# Patient Record
Sex: MALE | Race: WHITE | NOT HISPANIC OR LATINO | URBAN - METROPOLITAN AREA
[De-identification: names, ages, dates, MRNs, and addresses within clinical notes are randomized per-mention and may not be internally consistent; named-entity substitution may affect disease eponyms.]

---

## 2018-03-20 ENCOUNTER — APPOINTMENT (RX ONLY)
Dept: URBAN - METROPOLITAN AREA CLINIC 349 | Facility: CLINIC | Age: 63
Setting detail: DERMATOLOGY
End: 2018-03-20

## 2018-03-20 DIAGNOSIS — L259 CONTACT DERMATITIS AND OTHER ECZEMA, UNSPECIFIED CAUSE: ICD-10-CM

## 2018-03-20 DIAGNOSIS — L21.8 OTHER SEBORRHEIC DERMATITIS: ICD-10-CM

## 2018-03-20 PROBLEM — L30.8 OTHER SPECIFIED DERMATITIS: Status: ACTIVE | Noted: 2018-03-20

## 2018-03-20 PROCEDURE — ? COUNSELING

## 2018-03-20 PROCEDURE — ? PRESCRIPTION

## 2018-03-20 PROCEDURE — ? TREATMENT REGIMEN

## 2018-03-20 PROCEDURE — 99242 OFF/OP CONSLTJ NEW/EST SF 20: CPT

## 2018-03-20 PROCEDURE — ? OTHER

## 2018-03-20 RX ORDER — KETOCONAZOLE 20.5 MG/ML
SHAMPOO, SUSPENSION TOPICAL
Qty: 1 | Refills: 6 | Status: ERX | COMMUNITY
Start: 2018-03-20

## 2018-03-20 RX ORDER — DESONIDE 0.5 MG/G
CREAM TOPICAL BID
Qty: 1 | Refills: 2 | Status: ERX | COMMUNITY
Start: 2018-03-20

## 2018-03-20 RX ADMIN — DESONIDE: 0.5 CREAM TOPICAL at 14:25

## 2018-03-20 RX ADMIN — KETOCONAZOLE: 20.5 SHAMPOO, SUSPENSION TOPICAL at 14:29

## 2018-03-20 ASSESSMENT — LOCATION ZONE DERM: LOCATION ZONE: FACE

## 2018-03-20 ASSESSMENT — LOCATION SIMPLE DESCRIPTION DERM
LOCATION SIMPLE: RIGHT CHEEK
LOCATION SIMPLE: LEFT CHEEK
LOCATION SIMPLE: RIGHT EYEBROW
LOCATION SIMPLE: LEFT EYEBROW

## 2018-03-20 ASSESSMENT — LOCATION DETAILED DESCRIPTION DERM
LOCATION DETAILED: RIGHT CENTRAL EYEBROW
LOCATION DETAILED: RIGHT INFERIOR CENTRAL MALAR CHEEK
LOCATION DETAILED: LEFT SUPERIOR MEDIAL BUCCAL CHEEK
LOCATION DETAILED: LEFT CENTRAL EYEBROW

## 2018-03-20 NOTE — PROCEDURE: OTHER
Other (Free Text): Dr. Perry initiated and approved treatment plan\\n\\nWill not send rx for Desonide until we see if samples work
Note Text (......Xxx Chief Complaint.): Photographs taken with pt permission
Detail Level: Zone

## 2018-03-20 NOTE — PROCEDURE: TREATMENT REGIMEN
Initiate Treatment: Ketoconazole shampoo to use as face wash, leave on 3 minutes then rinse
Initiate Treatment: Desonide to use on eyelid area.  A very small amount 2 times daily for 2 weeks then daily.
Samples Given: Desonide
Detail Level: Zone

## 2018-05-09 ENCOUNTER — APPOINTMENT (RX ONLY)
Dept: URBAN - METROPOLITAN AREA CLINIC 349 | Facility: CLINIC | Age: 63
Setting detail: DERMATOLOGY
End: 2018-05-09

## 2018-05-09 ENCOUNTER — RX ONLY (OUTPATIENT)
Age: 63
Setting detail: RX ONLY
End: 2018-05-09

## 2018-05-09 DIAGNOSIS — L21.8 OTHER SEBORRHEIC DERMATITIS: ICD-10-CM | Status: IMPROVED

## 2018-05-09 DIAGNOSIS — L259 CONTACT DERMATITIS AND OTHER ECZEMA, UNSPECIFIED CAUSE: ICD-10-CM | Status: IMPROVED

## 2018-05-09 PROBLEM — L30.8 OTHER SPECIFIED DERMATITIS: Status: ACTIVE | Noted: 2018-05-09

## 2018-05-09 PROCEDURE — ? COUNSELING

## 2018-05-09 PROCEDURE — 99213 OFFICE O/P EST LOW 20 MIN: CPT

## 2018-05-09 PROCEDURE — ? TREATMENT REGIMEN

## 2018-05-09 PROCEDURE — ? OTHER

## 2018-05-09 RX ORDER — HYDROCORTISONE BUTYRATE 1 MG/G
CREAM TOPICAL
Qty: 1 | Refills: 2 | Status: ERX | COMMUNITY
Start: 2018-05-09

## 2018-05-09 ASSESSMENT — LOCATION DETAILED DESCRIPTION DERM
LOCATION DETAILED: LEFT SUPERIOR MEDIAL BUCCAL CHEEK
LOCATION DETAILED: LEFT CENTRAL EYEBROW
LOCATION DETAILED: RIGHT CENTRAL EYEBROW
LOCATION DETAILED: RIGHT INFERIOR CENTRAL MALAR CHEEK

## 2018-05-09 ASSESSMENT — LOCATION SIMPLE DESCRIPTION DERM
LOCATION SIMPLE: LEFT CHEEK
LOCATION SIMPLE: RIGHT CHEEK
LOCATION SIMPLE: RIGHT EYEBROW
LOCATION SIMPLE: LEFT EYEBROW

## 2018-05-09 ASSESSMENT — LOCATION ZONE DERM: LOCATION ZONE: FACE

## 2018-05-09 NOTE — PROCEDURE: TREATMENT REGIMEN
Samples Given: Desonide
Plan: Continue Desonide cream once daily or less often as needed.
Detail Level: Zone
Initiate Treatment: Desonide to use on eyelid area.  A very small amount 2 times daily for 2 weeks then daily.

## 2018-05-09 NOTE — PROCEDURE: OTHER
Other (Free Text): Dr. Perry initiated and approved treatment plan
Note Text (......Xxx Chief Complaint.): Photographs taken with pt permission
Detail Level: Zone

## 2022-05-31 ENCOUNTER — CLINICAL DOCUMENTATION (OUTPATIENT)
Dept: ORTHOPEDIC SURGERY | Age: 67
End: 2022-05-31

## 2022-10-04 ENCOUNTER — HOSPITAL ENCOUNTER (OUTPATIENT)
Facility: CLINIC | Age: 67
Setting detail: OBSERVATION
Discharge: HOME | End: 2022-10-05
Attending: EMERGENCY MEDICINE | Admitting: INTERNAL MEDICINE
Payer: MEDICARE

## 2022-10-04 ENCOUNTER — APPOINTMENT (OUTPATIENT)
Dept: CT IMAGING | Facility: CLINIC | Age: 67
End: 2022-10-04
Attending: EMERGENCY MEDICINE
Payer: MEDICARE

## 2022-10-04 DIAGNOSIS — R19.7 DIARRHEA, UNSPECIFIED TYPE: ICD-10-CM

## 2022-10-04 DIAGNOSIS — A04.72 C. DIFFICILE DIARRHEA: Primary | ICD-10-CM

## 2022-10-04 DIAGNOSIS — R10.11 RUQ ABDOMINAL PAIN: ICD-10-CM

## 2022-10-04 LAB
ALBUMIN SERPL-MCNC: 3.2 G/DL (ref 3.4–5)
ALBUMIN UR-MCNC: 20 MG/DL
ALP SERPL-CCNC: 118 U/L (ref 40–150)
ALT SERPL W P-5'-P-CCNC: 30 U/L (ref 0–70)
ANION GAP SERPL CALCULATED.3IONS-SCNC: 9 MMOL/L (ref 3–14)
APPEARANCE UR: CLEAR
AST SERPL W P-5'-P-CCNC: 16 U/L (ref 0–45)
ATRIAL RATE - MUSE: 91 BPM
BASOPHILS # BLD AUTO: 0 10E3/UL (ref 0–0.2)
BASOPHILS NFR BLD AUTO: 0 %
BILIRUB SERPL-MCNC: 0.9 MG/DL (ref 0.2–1.3)
BILIRUB UR QL STRIP: NEGATIVE
BUN SERPL-MCNC: 17 MG/DL (ref 7–30)
C DIFF TOX B STL QL: POSITIVE
CALCIUM SERPL-MCNC: 9.9 MG/DL (ref 8.5–10.1)
CHLORIDE BLD-SCNC: 105 MMOL/L (ref 94–109)
CO2 SERPL-SCNC: 23 MMOL/L (ref 20–32)
COLOR UR AUTO: YELLOW
CREAT SERPL-MCNC: 1.23 MG/DL (ref 0.66–1.25)
DIASTOLIC BLOOD PRESSURE - MUSE: NORMAL MMHG
EOSINOPHIL # BLD AUTO: 0 10E3/UL (ref 0–0.7)
EOSINOPHIL NFR BLD AUTO: 0 %
ERYTHROCYTE [DISTWIDTH] IN BLOOD BY AUTOMATED COUNT: 16.9 % (ref 10–15)
GFR SERPL CREATININE-BSD FRML MDRD: 65 ML/MIN/1.73M2
GLUCOSE BLD-MCNC: 94 MG/DL (ref 70–99)
GLUCOSE UR STRIP-MCNC: NEGATIVE MG/DL
HCT VFR BLD AUTO: 35.5 % (ref 40–53)
HGB BLD-MCNC: 11.4 G/DL (ref 13.3–17.7)
HGB UR QL STRIP: NEGATIVE
HOLD SPECIMEN: NORMAL
HYALINE CASTS: 3 /LPF
IMM GRANULOCYTES # BLD: 0.1 10E3/UL
IMM GRANULOCYTES NFR BLD: 1 %
INTERPRETATION ECG - MUSE: NORMAL
KETONES UR STRIP-MCNC: 10 MG/DL
LEUKOCYTE ESTERASE UR QL STRIP: NEGATIVE
LIPASE SERPL-CCNC: 181 U/L (ref 73–393)
LYMPHOCYTES # BLD AUTO: 2.1 10E3/UL (ref 0.8–5.3)
LYMPHOCYTES NFR BLD AUTO: 15 %
MCH RBC QN AUTO: 30.4 PG (ref 26.5–33)
MCHC RBC AUTO-ENTMCNC: 32.1 G/DL (ref 31.5–36.5)
MCV RBC AUTO: 95 FL (ref 78–100)
MONOCYTES # BLD AUTO: 1.1 10E3/UL (ref 0–1.3)
MONOCYTES NFR BLD AUTO: 8 %
MUCOUS THREADS #/AREA URNS LPF: PRESENT /LPF
NEUTROPHILS # BLD AUTO: 10.3 10E3/UL (ref 1.6–8.3)
NEUTROPHILS NFR BLD AUTO: 76 %
NITRATE UR QL: NEGATIVE
NRBC # BLD AUTO: 0 10E3/UL
NRBC BLD AUTO-RTO: 0 /100
P AXIS - MUSE: 38 DEGREES
PH UR STRIP: 5.5 [PH] (ref 5–7)
PLATELET # BLD AUTO: 310 10E3/UL (ref 150–450)
POTASSIUM BLD-SCNC: 4.7 MMOL/L (ref 3.4–5.3)
PR INTERVAL - MUSE: 174 MS
PROT SERPL-MCNC: 8 G/DL (ref 6.8–8.8)
QRS DURATION - MUSE: 112 MS
QT - MUSE: 390 MS
QTC - MUSE: 479 MS
R AXIS - MUSE: 31 DEGREES
RBC # BLD AUTO: 3.75 10E6/UL (ref 4.4–5.9)
RBC URINE: 0 /HPF
SARS-COV-2 RNA RESP QL NAA+PROBE: NEGATIVE
SODIUM SERPL-SCNC: 137 MMOL/L (ref 133–144)
SP GR UR STRIP: 1.02 (ref 1–1.03)
SYSTOLIC BLOOD PRESSURE - MUSE: NORMAL MMHG
T AXIS - MUSE: 162 DEGREES
TROPONIN I SERPL HS-MCNC: 34 NG/L
UROBILINOGEN UR STRIP-MCNC: NORMAL MG/DL
VENTRICULAR RATE- MUSE: 91 BPM
WBC # BLD AUTO: 13.7 10E3/UL (ref 4–11)
WBC URINE: 1 /HPF

## 2022-10-04 PROCEDURE — 84484 ASSAY OF TROPONIN QUANT: CPT | Performed by: EMERGENCY MEDICINE

## 2022-10-04 PROCEDURE — 250N000013 HC RX MED GY IP 250 OP 250 PS 637: Performed by: INTERNAL MEDICINE

## 2022-10-04 PROCEDURE — G1010 CDSM STANSON: HCPCS

## 2022-10-04 PROCEDURE — 93005 ELECTROCARDIOGRAM TRACING: CPT

## 2022-10-04 PROCEDURE — 258N000003 HC RX IP 258 OP 636: Performed by: EMERGENCY MEDICINE

## 2022-10-04 PROCEDURE — C9803 HOPD COVID-19 SPEC COLLECT: HCPCS

## 2022-10-04 PROCEDURE — 87493 C DIFF AMPLIFIED PROBE: CPT | Performed by: EMERGENCY MEDICINE

## 2022-10-04 PROCEDURE — 99220 PR INITIAL OBSERVATION CARE,LEVEL III: CPT | Performed by: INTERNAL MEDICINE

## 2022-10-04 PROCEDURE — 80053 COMPREHEN METABOLIC PANEL: CPT | Performed by: EMERGENCY MEDICINE

## 2022-10-04 PROCEDURE — 250N000011 HC RX IP 250 OP 636: Performed by: INTERNAL MEDICINE

## 2022-10-04 PROCEDURE — U0003 INFECTIOUS AGENT DETECTION BY NUCLEIC ACID (DNA OR RNA); SEVERE ACUTE RESPIRATORY SYNDROME CORONAVIRUS 2 (SARS-COV-2) (CORONAVIRUS DISEASE [COVID-19]), AMPLIFIED PROBE TECHNIQUE, MAKING USE OF HIGH THROUGHPUT TECHNOLOGIES AS DESCRIBED BY CMS-2020-01-R: HCPCS | Performed by: EMERGENCY MEDICINE

## 2022-10-04 PROCEDURE — 36415 COLL VENOUS BLD VENIPUNCTURE: CPT | Performed by: EMERGENCY MEDICINE

## 2022-10-04 PROCEDURE — 96374 THER/PROPH/DIAG INJ IV PUSH: CPT

## 2022-10-04 PROCEDURE — 81001 URINALYSIS AUTO W/SCOPE: CPT | Performed by: EMERGENCY MEDICINE

## 2022-10-04 PROCEDURE — G0378 HOSPITAL OBSERVATION PER HR: HCPCS

## 2022-10-04 PROCEDURE — 82040 ASSAY OF SERUM ALBUMIN: CPT | Performed by: EMERGENCY MEDICINE

## 2022-10-04 PROCEDURE — 99291 CRITICAL CARE FIRST HOUR: CPT | Mod: 25

## 2022-10-04 PROCEDURE — 250N000011 HC RX IP 250 OP 636: Performed by: EMERGENCY MEDICINE

## 2022-10-04 PROCEDURE — 85025 COMPLETE CBC W/AUTO DIFF WBC: CPT | Performed by: EMERGENCY MEDICINE

## 2022-10-04 PROCEDURE — 83690 ASSAY OF LIPASE: CPT | Performed by: EMERGENCY MEDICINE

## 2022-10-04 PROCEDURE — 96361 HYDRATE IV INFUSION ADD-ON: CPT

## 2022-10-04 RX ORDER — AMIODARONE HYDROCHLORIDE 100 MG/1
100 TABLET ORAL DAILY
COMMUNITY

## 2022-10-04 RX ORDER — ATORVASTATIN CALCIUM 40 MG/1
40 TABLET, FILM COATED ORAL DAILY
COMMUNITY

## 2022-10-04 RX ORDER — TRAMADOL HYDROCHLORIDE 50 MG/1
50 TABLET ORAL EVERY 6 HOURS PRN
Status: DISCONTINUED | OUTPATIENT
Start: 2022-10-04 | End: 2022-10-05 | Stop reason: HOSPADM

## 2022-10-04 RX ORDER — TRAMADOL HYDROCHLORIDE 50 MG/1
50 TABLET ORAL EVERY 6 HOURS PRN
COMMUNITY

## 2022-10-04 RX ORDER — GABAPENTIN 100 MG/1
100 CAPSULE ORAL 2 TIMES DAILY
Status: DISCONTINUED | OUTPATIENT
Start: 2022-10-04 | End: 2022-10-05 | Stop reason: HOSPADM

## 2022-10-04 RX ORDER — ACETAMINOPHEN 500 MG
500 TABLET ORAL 2 TIMES DAILY PRN
COMMUNITY

## 2022-10-04 RX ORDER — PROCHLORPERAZINE MALEATE 5 MG
5 TABLET ORAL EVERY 6 HOURS PRN
Status: DISCONTINUED | OUTPATIENT
Start: 2022-10-04 | End: 2022-10-05 | Stop reason: HOSPADM

## 2022-10-04 RX ORDER — POLYETHYLENE GLYCOL 3350 17 G/17G
1 POWDER, FOR SOLUTION ORAL DAILY PRN
COMMUNITY

## 2022-10-04 RX ORDER — SODIUM CHLORIDE 9 MG/ML
INJECTION, SOLUTION INTRAVENOUS CONTINUOUS
Status: DISCONTINUED | OUTPATIENT
Start: 2022-10-04 | End: 2022-10-05

## 2022-10-04 RX ORDER — VANCOMYCIN HYDROCHLORIDE 125 MG/1
125 CAPSULE ORAL 4 TIMES DAILY
Status: DISCONTINUED | OUTPATIENT
Start: 2022-10-04 | End: 2022-10-05 | Stop reason: HOSPADM

## 2022-10-04 RX ORDER — BISACODYL 10 MG
10 SUPPOSITORY, RECTAL RECTAL DAILY PRN
Status: DISCONTINUED | OUTPATIENT
Start: 2022-10-04 | End: 2022-10-05 | Stop reason: HOSPADM

## 2022-10-04 RX ORDER — ONDANSETRON 2 MG/ML
4 INJECTION INTRAMUSCULAR; INTRAVENOUS EVERY 30 MIN PRN
Status: DISCONTINUED | OUTPATIENT
Start: 2022-10-04 | End: 2022-10-05

## 2022-10-04 RX ORDER — CEFTRIAXONE 2 G/1
2 INJECTION, POWDER, FOR SOLUTION INTRAMUSCULAR; INTRAVENOUS ONCE
Status: DISCONTINUED | OUTPATIENT
Start: 2022-10-04 | End: 2022-10-04

## 2022-10-04 RX ORDER — GABAPENTIN 100 MG/1
100 CAPSULE ORAL 2 TIMES DAILY
COMMUNITY

## 2022-10-04 RX ORDER — AMOXICILLIN 250 MG
1 CAPSULE ORAL DAILY PRN
COMMUNITY

## 2022-10-04 RX ORDER — SODIUM CHLORIDE, SODIUM LACTATE, POTASSIUM CHLORIDE, CALCIUM CHLORIDE 600; 310; 30; 20 MG/100ML; MG/100ML; MG/100ML; MG/100ML
INJECTION, SOLUTION INTRAVENOUS CONTINUOUS
Status: DISCONTINUED | OUTPATIENT
Start: 2022-10-04 | End: 2022-10-05 | Stop reason: HOSPADM

## 2022-10-04 RX ORDER — CLOPIDOGREL BISULFATE 75 MG/1
75 TABLET ORAL DAILY
Status: DISCONTINUED | OUTPATIENT
Start: 2022-10-04 | End: 2022-10-05 | Stop reason: HOSPADM

## 2022-10-04 RX ORDER — METRONIDAZOLE 500 MG/100ML
500 INJECTION, SOLUTION INTRAVENOUS ONCE
Status: DISCONTINUED | OUTPATIENT
Start: 2022-10-04 | End: 2022-10-04

## 2022-10-04 RX ORDER — ONDANSETRON 2 MG/ML
4 INJECTION INTRAMUSCULAR; INTRAVENOUS EVERY 6 HOURS PRN
Status: DISCONTINUED | OUTPATIENT
Start: 2022-10-04 | End: 2022-10-05 | Stop reason: HOSPADM

## 2022-10-04 RX ORDER — ONDANSETRON 4 MG/1
4 TABLET, ORALLY DISINTEGRATING ORAL EVERY 6 HOURS PRN
Status: DISCONTINUED | OUTPATIENT
Start: 2022-10-04 | End: 2022-10-05 | Stop reason: HOSPADM

## 2022-10-04 RX ORDER — PANTOPRAZOLE SODIUM 40 MG/1
40 TABLET, DELAYED RELEASE ORAL 2 TIMES DAILY
COMMUNITY

## 2022-10-04 RX ORDER — CLOPIDOGREL BISULFATE 75 MG/1
75 TABLET ORAL DAILY
COMMUNITY

## 2022-10-04 RX ORDER — PANTOPRAZOLE SODIUM 40 MG/1
40 TABLET, DELAYED RELEASE ORAL 2 TIMES DAILY
Status: DISCONTINUED | OUTPATIENT
Start: 2022-10-04 | End: 2022-10-05 | Stop reason: HOSPADM

## 2022-10-04 RX ORDER — PROCHLORPERAZINE 25 MG
12.5 SUPPOSITORY, RECTAL RECTAL EVERY 12 HOURS PRN
Status: DISCONTINUED | OUTPATIENT
Start: 2022-10-04 | End: 2022-10-05 | Stop reason: HOSPADM

## 2022-10-04 RX ORDER — POLYETHYLENE GLYCOL 3350 17 G/17G
17 POWDER, FOR SOLUTION ORAL DAILY PRN
Status: DISCONTINUED | OUTPATIENT
Start: 2022-10-04 | End: 2022-10-05 | Stop reason: HOSPADM

## 2022-10-04 RX ADMIN — ONDANSETRON 4 MG: 4 TABLET, ORALLY DISINTEGRATING ORAL at 20:47

## 2022-10-04 RX ADMIN — SODIUM CHLORIDE: 9 INJECTION, SOLUTION INTRAVENOUS at 17:36

## 2022-10-04 RX ADMIN — CLOPIDOGREL BISULFATE 75 MG: 75 TABLET ORAL at 20:52

## 2022-10-04 RX ADMIN — GABAPENTIN 100 MG: 100 CAPSULE ORAL at 20:46

## 2022-10-04 RX ADMIN — ONDANSETRON 4 MG: 2 INJECTION INTRAMUSCULAR; INTRAVENOUS at 10:28

## 2022-10-04 RX ADMIN — VANCOMYCIN HYDROCHLORIDE 125 MG: 125 CAPSULE ORAL at 22:18

## 2022-10-04 RX ADMIN — PANTOPRAZOLE SODIUM 40 MG: 40 TABLET, DELAYED RELEASE ORAL at 20:46

## 2022-10-04 ASSESSMENT — ENCOUNTER SYMPTOMS
ABDOMINAL PAIN: 1
COUGH: 0
NAUSEA: 1
APPETITE CHANGE: 1
VOMITING: 1
FEVER: 0

## 2022-10-04 ASSESSMENT — ACTIVITIES OF DAILY LIVING (ADL)
ADLS_ACUITY_SCORE: 35

## 2022-10-04 NOTE — ED NOTES
[facility]  ED Nurse Handoff Report    ED Chief complaint: Abdominal Pain and Nausea, Vomiting, & Diarrhea      ED Diagnosis:   Final diagnoses:   RUQ abdominal pain   Diarrhea, unspecified type       Code Status: Not addressed in ED    Allergies: No Known Allergies    Patient Story: Abd pain, no appetite, recent MI, open heart surgery, has necrotic toes from vasopressors, need cholecystectomy but unable to yet - has drain  Focused Assessment:    Labs Ordered and Resulted from Time of ED Arrival to Time of ED Departure   COMPREHENSIVE METABOLIC PANEL - Abnormal       Result Value    Sodium 137      Potassium 4.7      Chloride 105      Carbon Dioxide (CO2) 23      Anion Gap 9      Urea Nitrogen 17      Creatinine 1.23      Calcium 9.9      Glucose 94      Alkaline Phosphatase 118      AST 16      ALT 30      Protein Total 8.0      Albumin 3.2 (*)     Bilirubin Total 0.9      GFR Estimate 65     CBC WITH PLATELETS AND DIFFERENTIAL - Abnormal    WBC Count 13.7 (*)     RBC Count 3.75 (*)     Hemoglobin 11.4 (*)     Hematocrit 35.5 (*)     MCV 95      MCH 30.4      MCHC 32.1      RDW 16.9 (*)     Platelet Count 310      % Neutrophils 76      % Lymphocytes 15      % Monocytes 8      % Eosinophils 0      % Basophils 0      % Immature Granulocytes 1      NRBCs per 100 WBC 0      Absolute Neutrophils 10.3 (*)     Absolute Lymphocytes 2.1      Absolute Monocytes 1.1      Absolute Eosinophils 0.0      Absolute Basophils 0.0      Absolute Immature Granulocytes 0.1      Absolute NRBCs 0.0     ROUTINE UA WITH MICROSCOPIC REFLEX TO CULTURE - Abnormal    Color Urine Yellow      Appearance Urine Clear      Glucose Urine Negative      Bilirubin Urine Negative      Ketones Urine 10  (*)     Specific Gravity Urine 1.019      Blood Urine Negative      pH Urine 5.5      Protein Albumin Urine 20  (*)     Urobilinogen Urine Normal      Nitrite Urine Negative      Leukocyte Esterase Urine Negative      Mucus Urine Present  "(*)     RBC Urine 0      WBC Urine 1      Hyaline Casts Urine 3 (*)    LIPASE - Normal    Lipase 181     TROPONIN I - Normal    Troponin I High Sensitivity 34     COVID-19 VIRUS (CORONAVIRUS) BY PCR   CLOSTRIDIUM DIFFICILE TOXIN B     Abd/pelvis CT no contrast - Stone Protocol   Final Result   IMPRESSION: Within the limitation of noncontrast exam, there is;   1. Mild distal sigmoid and rectal wall thickening, nonspecific, can be   seen with proctocolitis.   2. Mild diffuse urinary bladder wall thickening, nonspecific, can be   seen with chronic bladder outlet obstruction or chronic cystitis.   3. Right upper quadrant percutaneous cholecystostomy tube tip loops   within the gallbladder fossa.      RANDA PERRY MD            SYSTEM ID:  [id number]            Treatments and/or interventions provided: zofran  Patient's response to treatments and/or interventions:     To be done/followed up on inpatient unit:  see orders    Does this patient have any cognitive concerns?: none    Activity level - Baseline/Home:  Wheelchair  Activity Level - Current:   Wheelchair    Patient's Preferred language: English   Needed?: No    Isolation: None  Infection: Not Applicable  Patient tested for COVID 19 prior to admission: YES  Bariatric?: No    Vital Signs:   Vitals:    10/04/22 0917   BP: 135/84   Pulse: 104   Resp: 18   Temp: 97.9  F (36.6  C)   TempSrc: Temporal   SpO2: 100%   Weight: 60.8 kg (134 lb)   Height: 1.702 m (5' 7\")       Cardiac Rhythm:     Was the PSS-3 completed:   Yes  What interventions are required if any?               Family Comments: sig other at bedside  OBS brochure/video discussed/provided to patient/family: No              Name of person given brochure if not patient:               Relationship to patient:     For the majority of the shift this patient's behavior was Green.   Behavioral interventions performed were none.    ED NURSE PHONE NUMBER: *[id number]         "

## 2022-10-04 NOTE — ED PROVIDER NOTES
History   Chief Complaint:  Abdominal Pain and Nausea, Vomiting, & Diarrhea     The history is provided by the patient, a caregiver and medical records.      Stanford Moy is a 67 year old male who is anticoagulated on Eliquis with history of coronary artery disease, NSTEMI, congestive heart failure, and peripheral vascular disease who presents with abdominal pain, nausea, vomting, and diarrhea. The patient states he has had right upper quadrant abdominal pain, nausea, and reduced appetite for the past 4 days. He denies any fevers, cough, or chest pain. He has been having daily bowel movements, however he has had green stool lately. He endorses an episode of diarrhea this morning. He had not taken his daily medications today. Patient comes from [place] and is visiting his siblings in [place].     The patient states he had a stress test and a cardiac angiogram done this past summer, followed by stenting of the OM. A week afterwards on 08/03, the patient had an NSTEMI and possible stroke. He was admitted to the hospital for 42 days where he coded three times. He was noted to have gastrointestinal problems as well and had an endoscopy scheduled, however he coded the night before and was found to have hemopericardium after ROSC and underwent open heart surgery. He was then on dialysis and recently finished that. His creatinine was okay prior to leaving for [place], and his care team okayed his trip to [place]. Prior to leaving, the patient had an onset of abdominal pain and emesis, but was not a candidate for cholecystectomy so a percutaneous drain was placed in his gallbladder on 09/09. This would normally be emptied every other day, however recently this has needed to be emptied daily.     Review of Systems   Constitutional: Positive for appetite change. Negative for fever.   Respiratory: Negative for cough.    Cardiovascular: Negative for chest pain.   Gastrointestinal: Positive for abdominal pain,  "nausea and vomiting.   All other systems reviewed and are negative.    Allergies:  The patient has no known allergies.     Medications:  Amiodarone   Eliquis   Lipitor   Zyrtec   Plavix   Neurontin   Protonix   Miralax   Pericolace   Ultram     Past Medical History:     Congestive heart failure  Paroxysmal atrial fibrillation   Peripheral vascular disease  Coronary artery disease  Spinal stenosis, lumbar region   Spondylolisthesis, acquired   NSTEMI    Past Surgical History:    Spinal fusion  Back surgery (x2)   Colonoscopy     Family History:    Mother: Leukemia  Father: Myocardial Infarction  Sister: Myocardial Infarction (x2)  Brother: Myocardial Infarction     Social History:  The patient presents to the ED with a family member/caregiver.   PCP: No Ref-Primary, Physician     Physical Exam     Patient Vitals for the past 24 hrs:   BP Temp Temp src Pulse Resp SpO2 Height Weight   10/04/22 0917 135/84 97.9  F (36.6  C) Temporal 104 18 100 % 1.702 m (5' 7\") 60.8 kg (134 lb)     Physical Exam  VS: Reviewed per above  HENT: normal speeh  EYES: sclera anicteric  CV: Rate as noted, regular rhythm.   RESP: Effort normal. Breath sounds are normal bilaterally.  GI: Percutaneous cholecystostomy tube site without redness/warmth but there is surrounding  Tenderness without rebound/guarding, not distended.  NEURO: Alert, moving all extremities  MSK: No deformity of the extremities  SKIN: Warm and dry    Emergency Department Course   ECG  ECG taken at 0957, ECG read at 1003  Normal sinus rhythm   Low voltage QRS  T wave abnormality, consider inferior ischemia   Prolonged QT  Abnormal ECG  Rate 91 bpm. KS interval 174. QRS duration 112. QT/QTc 390/479. P-R-T axes 38 31 162.    Imaging:  Abd/pelvis CT no contrast - Stone Protocol   Final Result   IMPRESSION: Within the limitation of noncontrast exam, there is;   1. Mild distal sigmoid and rectal wall thickening, nonspecific, can be   seen with proctocolitis.   2. Mild diffuse " urinary bladder wall thickening, nonspecific, can be   seen with chronic bladder outlet obstruction or chronic cystitis.   3. Right upper quadrant percutaneous cholecystostomy tube tip loops   within the gallbladder fossa.      RANDA PERRY MD            SYSTEM ID:  [id number]      Report per radiology    Laboratory:  Labs Ordered and Resulted from Time of ED Arrival to Time of ED Departure   COMPREHENSIVE METABOLIC PANEL - Abnormal       Result Value    Sodium 137      Potassium 4.7      Chloride 105      Carbon Dioxide (CO2) 23      Anion Gap 9      Urea Nitrogen 17      Creatinine 1.23      Calcium 9.9      Glucose 94      Alkaline Phosphatase 118      AST 16      ALT 30      Protein Total 8.0      Albumin 3.2 (*)     Bilirubin Total 0.9      GFR Estimate 65     CBC WITH PLATELETS AND DIFFERENTIAL - Abnormal    WBC Count 13.7 (*)     RBC Count 3.75 (*)     Hemoglobin 11.4 (*)     Hematocrit 35.5 (*)     MCV 95      MCH 30.4      MCHC 32.1      RDW 16.9 (*)     Platelet Count 310      % Neutrophils 76      % Lymphocytes 15      % Monocytes 8      % Eosinophils 0      % Basophils 0      % Immature Granulocytes 1      NRBCs per 100 WBC 0      Absolute Neutrophils 10.3 (*)     Absolute Lymphocytes 2.1      Absolute Monocytes 1.1      Absolute Eosinophils 0.0      Absolute Basophils 0.0      Absolute Immature Granulocytes 0.1      Absolute NRBCs 0.0     ROUTINE UA WITH MICROSCOPIC REFLEX TO CULTURE - Abnormal    Color Urine Yellow      Appearance Urine Clear      Glucose Urine Negative      Bilirubin Urine Negative      Ketones Urine 10  (*)     Specific Gravity Urine 1.019      Blood Urine Negative      pH Urine 5.5      Protein Albumin Urine 20  (*)     Urobilinogen Urine Normal      Nitrite Urine Negative      Leukocyte Esterase Urine Negative      Mucus Urine Present (*)     RBC Urine 0      WBC Urine 1      Hyaline Casts Urine 3 (*)    LIPASE - Normal    Lipase 181     TROPONIN I - Normal    Troponin I High  Sensitivity 34     COVID-19 VIRUS (CORONAVIRUS) BY PCR   CLOSTRIDIUM DIFFICILE TOXIN B      Emergency Department Course:     Reviewed:  I reviewed nursing notes, vitals, past medical history and Care Everywhere    Assessments:  0932 I obtained history and examined the patient as noted above.   1215 I rechecked the patient and explained findings.   1311 I rechecked the patient and discussed plan of care.    Consults:  1237 I spoke with Dr. Gibbons of the general surgery service regarding patient's presentation, findings, and plan of care.   1514 I spoke to Dr. Almeida of the hospitalist service who accepts the patient for admission.     Interventions:    Medications   ondansetron (ZOFRAN) injection 4 mg (4 mg Intravenous Given 10/4/22 1028)   sodium chloride 0.9% infusion ( Intravenous New Bag 10/4/22 5056)       Disposition:  The patient was admitted to the hospital under the care of Dr. Almeida.     Impression & Plan   Medical Decision Making:  Patient presents to the ER for evaluation of nausea, loose stool, right upper quadrant abdominal pain.  Vital signs reassuring.  There is tenderness surrounding the right upper quadrant gastrostomy site.  No signs of skin or soft tissue infection at the skin level.  CT abdomen does not show acute pathology to explain his symptoms.  Percutaneous cholecystostomy tube appears to be well-seated in the gallbladder fossa.  Labs today do show worsening leukocytosis compared to outside values recently.  Discussed with general surgery.  No indication for emergent intervention.  If patient was admitted, could consider IR tube check.  Initially plan for empiric antibiotics to cover lingering cholecystitis but after discussion with hospitalist team, C. difficile PCR was desired prior to initiating antibiotics.  Slow maintenance fluids started in the ER.  Patient was admitted for further management of his symptoms as well as evaluation of possibly lingering cholecystitis.    Diagnosis:     ICD-10-CM    1. RUQ abdominal pain  R10.11    2. Diarrhea, unspecified type  R19.7      Scribe Disclosure:  I, Marya Martinez, am serving as a scribe at 9:30 AM on 10/4/2022 to document services personally performed by Dev Camacho MD based on my observations and the provider's statements to me.     Dev Camacho MD  10/04/22 1833

## 2022-10-04 NOTE — PHARMACY-ADMISSION MEDICATION HISTORY
Pharmacy Medication History  Admission medication history interview status for the 10/4/2022  admission is complete. See EPIC admission navigator for prior to admission medications     Location of Interview: Patient room  Medication history sources: Patient and Surescripts    Significant changes made to the medication list:  Added all medications    In the past week, patient estimated taking medication this percent of the time: greater than 90%    Additional medication history information:   none    Medication reconciliation completed by provider prior to medication history? No    Time spent in this activity: 20 minutes    Prior to Admission medications    Medication Sig Last Dose Taking? Auth Provider Long Term End Date   acetaminophen (TYLENOL) 500 MG tablet Take 500 mg by mouth 2 times daily as needed for mild pain  Yes Unknown, Entered By History     amiodarone (PACERONE) 100 MG TABS tablet Take 100 mg by mouth daily 10/4/2022 at noon Yes Unknown, Entered By History Yes    apixaban ANTICOAGULANT (ELIQUIS) 5 MG tablet Take 5 mg by mouth 2 times daily 10/3/2022 at pm Yes Unknown, Entered By History     atorvastatin (LIPITOR) 40 MG tablet Take 40 mg by mouth daily Past Week at Unknown time Yes Unknown, Entered By History Yes    clopidogrel (PLAVIX) 75 MG tablet Take 75 mg by mouth daily 10/3/2022 Yes Unknown, Entered By History Yes    gabapentin (NEURONTIN) 100 MG capsule Take 100 mg by mouth 2 times daily 10/3/2022 at pm Yes Unknown, Entered By History Yes    pantoprazole (PROTONIX) 40 MG EC tablet Take 40 mg by mouth 2 times daily 10/4/2022 at noon Yes Unknown, Entered By History     polyethylene glycol (MIRALAX) 17 g packet Take 1 packet by mouth daily as needed for constipation  Yes Unknown, Entered By History     senna-docusate (SENOKOT-S/PERICOLACE) 8.6-50 MG tablet Take 1 tablet by mouth daily as needed for constipation  Yes Unknown, Entered By History     traMADol (ULTRAM) 50 MG tablet Take 50 mg by mouth  every 6 hours as needed for severe pain Past Month at Unknown time Yes Unknown, Entered By History         The information provided in this note is only as accurate as the sources available at the time of update(s)

## 2022-10-04 NOTE — ED TRIAGE NOTES
Patient had MI in [place] Aug 3rd and was in hospital for 42 days. Had open heart surgery. Coded x 3. Has bilateral black toes from vasopressors. Drove up here to see family and now having 2 days of diarrhea, abdominal pain, nausea. Did need mike after heart surgery but unable to get surgery so has percutaneous mike drain that is draining well but having pain at insertion site.

## 2022-10-04 NOTE — H&P
[facility]    History and Physical - Hospitalist Service       Date of Admission:  10/4/2022    Assessment & Plan   Stanford Moy is a 67 year-old male with recent complicated history including NSTEMI with stent placement to OM 7/22/22, ischemic cardiomyopathy with HFrEF, hx of PEA arrest possibly secondary to hemopericardium s/p evacuation, paroxysmal atrial fibrillation, dyslipidemia, acalculous cholecystitis s/p cholecystostomy tube who presents with RUQ pain, nausea and early satiety. Admitted on 10/4/2022.       RUQ abdominal pain, nausea, early satiety, diarrhea  Recent acalculous cholecystitis status post cholecystostomy tube  *Presents with constellation of above symptoms in context of recent cholecystitis managed non-operatively due to recent cardiac issues below   *WBC mildly elevated at 13.7, tender in RUQ  *CT abdomen/pelvis with mild distal sigmoid and rectal wall thickening, right upper quadrant percutaneous cholecystomy tube looping within the gallbladder fossa  *Completed course of metronidazole + cefdinir previously   - General surgery consulted  - IR consulted for tube check  - Trend LFTs  - Hold on antibiotics for now pending C. difficile rule out  - C. difficile PCR pending  - Trend WBC, fever curve    ADDENDUM: C difficile positive. Start PO vancomycin. Given the majority of his symptomatology is localized to the RUQ, would plan to still pursue evaluation as above, though will hold off on any further antibiotics for now given positive C diff.     Ischemic cardiomyopathy with chronic HFrEF  Coronary artery disease with prior NSTEMI and THEODORE to OM 7/22/22  Hemopericardium s/p evacuation and pericardial window  Paroxysmal atrial fibrillation  Dyslipidemia  *Complicated recent history of above with prolonged hospitalization in [place].  *Most recent echocardiogram 8/27/2022 with EF 35 to 40%, no pericardial effusion  - Continue prior to admission amiodarone,  clopidogrel, atorvastatin  - Temporarily hold apixaban pending general surgery consult and IR tube check     Hx of acute kidney injury with acute tubular necrosis requiring HD  Creatinine 1.23 on admission which is stable from recent outpatient values. Remains off HD and making urine.   - Monitor BMP    Spinal stenosis with neurogenic claudication  History multiple back surgeries  - Continue prior to admission gabapentin, tramadol    GERD  - Continue prior to admission PPI       Diet: Regular Diet Adult Clear liquid diet, advance as tolerated  DVT Prophylaxis: Observation patient, short stay anticipated; anticipate resuming apixaban when able as well   French Catheter: Not present  Code Status:   Full code     Disposition Plan   [place] to observation status. Anticipate discharge within 24 hours if clinically improved.     Entered: Toño Almeida MD 10/04/2022, 7:07 PM     The patient's care was discussed with the patient and patient's wife    Clinically Significant Risk Factors Present on Admission             # Hypoalbuminemia: Albumin = 3.2 g/dL (Ref range: 3.4 - 5.0 g/dL) on admission, will monitor as appropriate   # Coagulation Defect: home medication list includes an anticoagulant medication  # Platelet Defect: home medication list includes an antiplatelet medication                Toño Almeida MD  [facility]    ______________________________________________________________________    Chief Complaint   RUQ pain, nausea, early satiety for 2-3 days    History of Present Illness   Stanford Moy is a 67 year old male who presents with the above chief complaint.    History is obtained from the patient and review of medical record. The patient has a complex recent history including prolonged hospitalization in [place] for NSTEMI with hemopericardium and PEA arrest requiring evacuation and pericardial window, subsequently with a calculus cholecystitis managed conservatively  with cholecystostomy tube and antibiotics due to concern for high surgical risk with his recent cardiac issues.  He has completed his course of antibiotics and reports he had otherwise been doing well until the last 2 to 3 days prior to admission when he developed increasing right upper quadrant pain, exacerbated by deep breathing, nausea without vomiting and early satiety.  The morning of presentation he had 1 episode of loose/watery stool.  He has not had any measured fevers or chills.  He denies any chest pain or shortness of breath.    In the Emergency Department, the patient was seen by Dr. Camacho, with whom I discussed the patient's presenting symptoms and emergency department course.  Initial vital signs were a temperature of 97.9F, , /84, RR 18, SpO2 100% on room air. Work-up included white blood cell count 13.7, CT abdomen/pelvis mild distal sigmoid and rectal wall thickening, right upper quadrant percutaneous cholecystomy tube looping within the gallbladder fossa. Hospitalists were contacted for admission to the hospital.     Review of Systems    Complete 10 point review of systems assessed and negative except as noted in HPI.    Past Medical History    I have reviewed this patient's medical history and updated it with pertinent information if needed.   Past Medical History:   Diagnosis Date     Acalculous cholecystitis     s/p cholecystostomy tube     Acute kidney injury with acute tubular necrosis (H)     Required temporary HD, now off     Cardiac arrest (H)     possibly from hemopericardium     Chronic systolic congestive heart failure (H)      Coronary artery disease involving native coronary artery of native heart     s/p THEODORE to OM 7/22/22     Hemopericardium     with tamponade s/p evacuation     Ischemic cardiomyopathy      NSTEMI (non-ST elevated myocardial infarction) (H)      Paroxysmal atrial fibrillation (H)      Spinal stenosis of lumbar region with neurogenic claudication           Past Surgical History   I have reviewed this patient's surgical history and updated it with pertinent information if needed.  Past Surgical History:   Procedure Laterality Date     BACK SURGERY      L5-S1 4/1984, L4-L5 2/1997, Fusion 4/2011         Social History   I have reviewed this patient's social history and updated it with pertinent information if needed.  Social History     Tobacco Use     Smoking status: Never Smoker     Rare alcohol use. Lives in [place].     Family History   I have reviewed this patient's family history and updated it with pertinent information if needed.   Family History   Problem Relation Age of Onset     Leukemia Mother      Myocardial Infarction Father      Myocardial Infarction Sister      Myocardial Infarction Brother         Prior to Admission Medications   Prior to Admission Medications   Prescriptions Last Dose Informant Patient Reported? Taking?   acetaminophen (TYLENOL) 500 MG tablet   Yes Yes   Sig: Take 500 mg by mouth 2 times daily as needed for mild pain   amiodarone (PACERONE) 100 MG TABS tablet 10/4/2022 at noon  Yes Yes   Sig: Take 100 mg by mouth daily   apixaban ANTICOAGULANT (ELIQUIS) 5 MG tablet 10/3/2022 at pm  Yes Yes   Sig: Take 5 mg by mouth 2 times daily   atorvastatin (LIPITOR) 40 MG tablet Past Week at Unknown time  Yes Yes   Sig: Take 40 mg by mouth daily   clopidogrel (PLAVIX) 75 MG tablet 10/3/2022  Yes Yes   Sig: Take 75 mg by mouth daily   gabapentin (NEURONTIN) 100 MG capsule 10/3/2022 at pm  Yes Yes   Sig: Take 100 mg by mouth 2 times daily   pantoprazole (PROTONIX) 40 MG EC tablet 10/4/2022 at noon  Yes Yes   Sig: Take 40 mg by mouth 2 times daily   polyethylene glycol (MIRALAX) 17 g packet   Yes Yes   Sig: Take 1 packet by mouth daily as needed for constipation   senna-docusate (SENOKOT-S/PERICOLACE) 8.6-50 MG tablet   Yes Yes   Sig: Take 1 tablet by mouth daily as needed for constipation   traMADol (ULTRAM) 50 MG tablet Past Month at  Unknown time  Yes Yes   Sig: Take 50 mg by mouth every 6 hours as needed for severe pain      Facility-Administered Medications: None     Allergies   No Known Allergies    Physical Exam   Vital Signs: Temp: 97.9  F (36.6  C) Temp src: Temporal BP: 103/79 Pulse: 93   Resp: 18 SpO2: 98 %      Weight: 134 lbs 0 oz    Constitutional: Well-appearing, NAD  Eyes: PERRL, EOMI  HENT: Oropharynx clear, MMM  Respiratory: Clear to auscultation bilaterally, good air movement, normal effort   Cardiovascular: RRR, no m/r/g. No peripheral edema.   GI: Soft, tender to palpation in RUQ without rebound tenderness or guarding, RUQ drain in place with >500 ml bilious fluid   Skin: Warm, dry   Neurologic: Alert. Responding to questions appropriately. Following commands.   Psychiatric: Normal affect, appropriate      Data   Data reviewed today: I reviewed all medications, new labs and imaging results over the last 24 hours. I personally reviewed the EKG tracing showing sinus rhythm, non-specific ST-T wave changes .    Recent Labs   Lab 10/04/22  0953   WBC 13.7*   HGB 11.4*   MCV 95         POTASSIUM 4.7   CHLORIDE 105   CO2 23   BUN 17   CR 1.23   ANIONGAP 9   KAMILA 9.9   GLC 94   ALBUMIN 3.2*   PROTTOTAL 8.0   BILITOTAL 0.9   ALKPHOS 118   ALT 30   AST 16   LIPASE 181       Recent Results (from the past 24 hour(s))   Abd/pelvis CT no contrast - Stone Protocol    Narrative    CT ABDOMEN/PELVIS WITHOUT CONTRAST October 4, 2022 10:44 AM    HISTORY: Right upper quadrant pain, early satiety, nausea, diarrhea,  recent perc cholecystotomy tube 9/10/22 for diagnosis of acalculous  cholecystitis.    TECHNIQUE: CT scan obtained of the abdomen, and pelvis without IV  contrast. Radiation dose for this scan was reduced using automated  exposure control, adjustment of the mA and/or kV according to patient  size, or iterative reconstruction technique.    COMPARISON: None available.    FINDINGS:    Lower chest: Right basilar pulmonary  opacities, likely atelectasis.    Abdomen/pelvis:Limited evaluation of the abdominal organs due to lack  of intravenous contrast, within this limitation, there is;    Hepatobiliary: The unenhanced liver is grossly unremarkable. There is  percutaneous cholecystostomy tube within the gallbladder fossa.    Pancreas: The unenhanced pancreas is grossly unremarkable.    Spleen: No splenomegaly.    Adrenal glands: No adrenal nodules.    Kidneys: No radiodense kidney/ureteral stones or hydronephrosis in the  kidney. 2 cm renal cyst at the interpolar region of the right kidney  (series 3 image 71).    Bowel: No abnormally dilated bowel loops. Mild distal sigmoid and  rectal wall thickening, nonspecific, can be seen with proctocolitis.  Colonic diverticulosis without CT evidence of acute diverticulitis.    Peritoneum: No significant free fluid in the abdomen or pelvis. No  free peritoneal or portal venous gas.    Pelvic organs: Mild diffuse urinary bladder wall thickening,  nonspecific, can be seen with chronic bladder outlet obstruction or  chronic cystitis.    Vascular: Moderate atherosclerotic vascular calcification of the  abdominal aorta and iliac vessels.    Lymph nodes: A few mildly enlarged lower abdominal/pelvic lymph nodes  including 0.9 cm short axis right lower quadrant mesenteric node  (series 3 image 86), indeterminate, could be reactive.    Bones and soft tissue: Postsurgical changes of L4-S1 spinal fusion.      Impression    IMPRESSION: Within the limitation of noncontrast exam, there is;  1. Mild distal sigmoid and rectal wall thickening, nonspecific, can be  seen with proctocolitis.  2. Mild diffuse urinary bladder wall thickening, nonspecific, can be  seen with chronic bladder outlet obstruction or chronic cystitis.  3. Right upper quadrant percutaneous cholecystostomy tube tip loops  within the gallbladder fossa.    RANDA PERRY MD         SYSTEM ID:  [id number]

## 2022-10-04 NOTE — ED NOTES
Patient had percutaneous drain placed 9/9. States 4 days ago developed feeling full and not having appetite. 2 days ago developed headache and nausea. Diarrhea episode this morning. Abdominal pain started yesterday.

## 2022-10-05 ENCOUNTER — APPOINTMENT (OUTPATIENT)
Dept: INTERVENTIONAL RADIOLOGY/VASCULAR | Facility: CLINIC | Age: 67
End: 2022-10-05
Attending: INTERNAL MEDICINE
Payer: MEDICARE

## 2022-10-05 VITALS
HEART RATE: 88 BPM | DIASTOLIC BLOOD PRESSURE: 67 MMHG | BODY MASS INDEX: 21.03 KG/M2 | OXYGEN SATURATION: 96 % | RESPIRATION RATE: 18 BRPM | WEIGHT: 134 LBS | HEIGHT: 67 IN | SYSTOLIC BLOOD PRESSURE: 97 MMHG | TEMPERATURE: 98 F

## 2022-10-05 LAB
ALBUMIN SERPL-MCNC: 2.7 G/DL (ref 3.4–5)
ALP SERPL-CCNC: 104 U/L (ref 40–150)
ALT SERPL W P-5'-P-CCNC: 21 U/L (ref 0–70)
ANION GAP SERPL CALCULATED.3IONS-SCNC: 2 MMOL/L (ref 3–14)
AST SERPL W P-5'-P-CCNC: 15 U/L (ref 0–45)
BILIRUB SERPL-MCNC: 0.4 MG/DL (ref 0.2–1.3)
BUN SERPL-MCNC: 15 MG/DL (ref 7–30)
CALCIUM SERPL-MCNC: 8.9 MG/DL (ref 8.5–10.1)
CHLORIDE BLD-SCNC: 109 MMOL/L (ref 94–109)
CO2 SERPL-SCNC: 27 MMOL/L (ref 20–32)
CREAT SERPL-MCNC: 1.18 MG/DL (ref 0.66–1.25)
ERYTHROCYTE [DISTWIDTH] IN BLOOD BY AUTOMATED COUNT: 16.6 % (ref 10–15)
GFR SERPL CREATININE-BSD FRML MDRD: 68 ML/MIN/1.73M2
GLUCOSE BLD-MCNC: 91 MG/DL (ref 70–99)
HCT VFR BLD AUTO: 31.3 % (ref 40–53)
HGB BLD-MCNC: 10.3 G/DL (ref 13.3–17.7)
MCH RBC QN AUTO: 30.9 PG (ref 26.5–33)
MCHC RBC AUTO-ENTMCNC: 32.9 G/DL (ref 31.5–36.5)
MCV RBC AUTO: 94 FL (ref 78–100)
PLATELET # BLD AUTO: 243 10E3/UL (ref 150–450)
POTASSIUM BLD-SCNC: 4.5 MMOL/L (ref 3.4–5.3)
PROT SERPL-MCNC: 7 G/DL (ref 6.8–8.8)
RBC # BLD AUTO: 3.33 10E6/UL (ref 4.4–5.9)
SODIUM SERPL-SCNC: 138 MMOL/L (ref 133–144)
WBC # BLD AUTO: 8.7 10E3/UL (ref 4–11)

## 2022-10-05 PROCEDURE — 250N000009 HC RX 250: Performed by: NURSE PRACTITIONER

## 2022-10-05 PROCEDURE — G0378 HOSPITAL OBSERVATION PER HR: HCPCS

## 2022-10-05 PROCEDURE — 80053 COMPREHEN METABOLIC PANEL: CPT | Performed by: INTERNAL MEDICINE

## 2022-10-05 PROCEDURE — 99217 PR OBSERVATION CARE DISCHARGE: CPT | Performed by: HOSPITALIST

## 2022-10-05 PROCEDURE — 250N000013 HC RX MED GY IP 250 OP 250 PS 637: Performed by: INTERNAL MEDICINE

## 2022-10-05 PROCEDURE — 258N000003 HC RX IP 258 OP 636: Performed by: INTERNAL MEDICINE

## 2022-10-05 PROCEDURE — 82040 ASSAY OF SERUM ALBUMIN: CPT | Performed by: INTERNAL MEDICINE

## 2022-10-05 PROCEDURE — 99204 OFFICE O/P NEW MOD 45 MIN: CPT | Performed by: SURGERY

## 2022-10-05 PROCEDURE — 85027 COMPLETE CBC AUTOMATED: CPT | Performed by: INTERNAL MEDICINE

## 2022-10-05 PROCEDURE — 47531 INJECTION FOR CHOLANGIOGRAM: CPT

## 2022-10-05 PROCEDURE — 36415 COLL VENOUS BLD VENIPUNCTURE: CPT | Performed by: INTERNAL MEDICINE

## 2022-10-05 RX ORDER — VANCOMYCIN HYDROCHLORIDE 125 MG/1
125 CAPSULE ORAL 4 TIMES DAILY
Qty: 53 CAPSULE | Refills: 0 | Status: SHIPPED | OUTPATIENT
Start: 2022-10-05

## 2022-10-05 RX ORDER — NALOXONE HYDROCHLORIDE 0.4 MG/ML
0.4 INJECTION, SOLUTION INTRAMUSCULAR; INTRAVENOUS; SUBCUTANEOUS
Status: DISCONTINUED | OUTPATIENT
Start: 2022-10-05 | End: 2022-10-05

## 2022-10-05 RX ORDER — NALOXONE HYDROCHLORIDE 0.4 MG/ML
0.4 INJECTION, SOLUTION INTRAMUSCULAR; INTRAVENOUS; SUBCUTANEOUS
Status: DISCONTINUED | OUTPATIENT
Start: 2022-10-05 | End: 2022-10-05 | Stop reason: HOSPADM

## 2022-10-05 RX ORDER — AMPICILLIN AND SULBACTAM 2; 1 G/1; G/1
3 INJECTION, POWDER, FOR SOLUTION INTRAMUSCULAR; INTRAVENOUS
Status: DISCONTINUED | OUTPATIENT
Start: 2022-10-05 | End: 2022-10-05

## 2022-10-05 RX ORDER — NALOXONE HYDROCHLORIDE 0.4 MG/ML
0.2 INJECTION, SOLUTION INTRAMUSCULAR; INTRAVENOUS; SUBCUTANEOUS
Status: DISCONTINUED | OUTPATIENT
Start: 2022-10-05 | End: 2022-10-05

## 2022-10-05 RX ORDER — NALOXONE HYDROCHLORIDE 0.4 MG/ML
0.2 INJECTION, SOLUTION INTRAMUSCULAR; INTRAVENOUS; SUBCUTANEOUS
Status: DISCONTINUED | OUTPATIENT
Start: 2022-10-05 | End: 2022-10-05 | Stop reason: HOSPADM

## 2022-10-05 RX ORDER — FENTANYL CITRATE 50 UG/ML
25-50 INJECTION, SOLUTION INTRAMUSCULAR; INTRAVENOUS EVERY 5 MIN PRN
Status: DISCONTINUED | OUTPATIENT
Start: 2022-10-05 | End: 2022-10-05

## 2022-10-05 RX ORDER — FLUMAZENIL 0.1 MG/ML
0.2 INJECTION, SOLUTION INTRAVENOUS
Status: DISCONTINUED | OUTPATIENT
Start: 2022-10-05 | End: 2022-10-05 | Stop reason: HOSPADM

## 2022-10-05 RX ADMIN — PANTOPRAZOLE SODIUM 40 MG: 40 TABLET, DELAYED RELEASE ORAL at 08:57

## 2022-10-05 RX ADMIN — CLOPIDOGREL BISULFATE 75 MG: 75 TABLET ORAL at 08:57

## 2022-10-05 RX ADMIN — GABAPENTIN 100 MG: 100 CAPSULE ORAL at 08:57

## 2022-10-05 RX ADMIN — LIDOCAINE HYDROCHLORIDE 1 ML: 10 INJECTION, SOLUTION INFILTRATION; PERINEURAL at 10:50

## 2022-10-05 RX ADMIN — AMIODARONE HYDROCHLORIDE 100 MG: 200 TABLET ORAL at 08:57

## 2022-10-05 RX ADMIN — SODIUM CHLORIDE, POTASSIUM CHLORIDE, SODIUM LACTATE AND CALCIUM CHLORIDE: 600; 310; 30; 20 INJECTION, SOLUTION INTRAVENOUS at 01:30

## 2022-10-05 RX ADMIN — SODIUM CHLORIDE, POTASSIUM CHLORIDE, SODIUM LACTATE AND CALCIUM CHLORIDE: 600; 310; 30; 20 INJECTION, SOLUTION INTRAVENOUS at 08:57

## 2022-10-05 RX ADMIN — VANCOMYCIN HYDROCHLORIDE 125 MG: 125 CAPSULE ORAL at 08:57

## 2022-10-05 RX ADMIN — VANCOMYCIN HYDROCHLORIDE 125 MG: 125 CAPSULE ORAL at 13:35

## 2022-10-05 ASSESSMENT — ACTIVITIES OF DAILY LIVING (ADL)
ADLS_ACUITY_SCORE: 35
ADLS_ACUITY_SCORE: 37
ADLS_ACUITY_SCORE: 34
ADLS_ACUITY_SCORE: 37

## 2022-10-05 NOTE — PROGRESS NOTES
Pt is ready for discharge . Discharge education and follow-ups appointments explained to the patient. All patient belongings and medications are sent with the patient. Pt is leaving with his wife. Pt's wife is at bedside . Johnnie Martinez RN on 10/5/2022 at 4:06 PM

## 2022-10-05 NOTE — CONSULTS
Patient is on IR schedule today 10/5/22 Wednesday for a gallbladder tube check and possible exchange late morning versus mid afternoon.     -Labs WNL for procedure.    -Orders for NPO and antibiotics for procedure have been entered.    -Consent will be done prior to procedure.     Please contact the IR department at [phone] for procedural related questions.     Thanks, Rosalie [facility] Interventional Radiology CNP ([phone]) (phone [phone])

## 2022-10-05 NOTE — DISCHARGE INSTRUCTIONS
Gallbladder drain home care    1) Change the gauze and tape dressing every 2-3 days. Wash the skin with soap and water and allow to air dry before re dressing  -Please cover with plastic (saran wrap) prior to showering and change it the dressing gets wet.     2) Empty, measure and record the outputs daily.     3) If you experience Jaundice (yellowing of the skin), increased RUQ pain, nausea or fevers please attach the drainage bag to the gallbladder drain for external drainage.     Please call Yina SOL RN clinician at  or Rosalie SOL NP at  for questions or concerns about the tube. You can leave a voicemail. We work Monday through Friday [phone] or [phone].     An alternative number to call for questions is Interventional Radiology at

## 2022-10-05 NOTE — PRE-PROCEDURE
GENERAL PRE-PROCEDURE:   Procedure:  Gallbladder drain check and possible exchange with possible IV moderate sedation   Date/Time:  10/5/2022 10:12 AM    Written consent obtained?: Yes    Risks and benefits: Risks, benefits and alternatives were discussed    Consent given by:  Patient  Patient states understanding of procedure being performed: Yes    Patient's understanding of procedure matches consent: Yes    Procedure consent matches procedure scheduled: Yes    Expected level of sedation:  Moderate  Appropriately NPO:  Yes  ASA Class:  3  Mallampati  :  Grade 1- soft palate, uvula, tonsillar pillars, and posterior pharyngeal wall visible  Lungs:  Lungs clear with good breath sounds bilaterally  Heart:  Normal heart sounds and rate  History & Physical reviewed:  History and physical reviewed and no updates needed  Statement of review:  I have reviewed the lab findings, diagnostic data, medications, and the plan for sedation  Outputs have been ~ 600 mL a day for the past 2 weeks and prior to that it was ~ 300 mL a day. Pain in his RU to mid Q since the past 4 days. LF labs are all normal.     Thanks [facility] Interventional Radiology CNP ([phone]) (phone [phone])

## 2022-10-05 NOTE — DISCHARGE SUMMARY
[facility]    Discharge Summary  Hospitalist    Date of Admission:  10/4/2022  Date of Discharge:  10/5/2022  Discharging Provider: Juan Francisco Ortez MD  Date of Service (when I saw the patient): 10/05/22      History of Present Illness   Stanford Moy is a 67 year-old male with recent complicated history including NSTEMI with stent placement to OM 22, ischemic cardiomyopathy with HFrEF, hx of PEA arrest possibly secondary to hemopericardium s/p evacuation, paroxysmal atrial fibrillation, dyslipidemia, acalculous cholecystitis s/p cholecystostomy tube done in resident state of [place] who presents with RUQ pain, nausea and early satiety. Admitted on 10/4/2022.   Patient is a resident of [place] however is in [place] for his brother's .    Hospital Course   Stanford Moy was admitted on 10/4/2022.  The following problems were addressed during his hospitalization:    RUQ abdominal pain, nausea, early satiety, diarrhea  Recent acalculous cholecystitis status post cholecystostomy tube  *Presents with constellation of above symptoms in context of recent cholecystitis managed non-operatively in [facility] with cholecystomy tube due to recent cardiac issues below   *WBC mildly elevated at 13.7, tender in RUQ  *CT abdomen/pelvis with mild distal sigmoid and rectal wall thickening, right upper quadrant percutaneous cholecystomy tube looping within the gallbladder fossa  *Completed course of metronidazole + cefdinir previously   - General surgery consult: defer to IR for tube check and management  - IR consult: Removed cholecystotomy tube, reconnected internally.  Afterwards patient with pain resolution, instructed to follow-up with PCP and surgeons on return to [place] especially regarding cholecystotomy tube.  Instructed if recurrence of pain to reinsert cholecystotomy to to external drainage.  - LFTs WNL  - C. difficile PCR +; start p.o. vancomycin 4 times  daily x6 weeks to be followed by PCP on return to [place].  Patient reports only 1 watery stool x1 yesterday, none today; no further abdominal pain after tube removal, nausea, emesis.  Tolerated regular diet.  -Repeat WBC this morning normal.  Afebrile.  -Prompt resolution of pain after tube removal suggest pain secondary to tube placement/irritation.  Patient anxious to discharge as he is a resident of [place] and will stay with family until brother's  on .  He is knowledgeable regarding diet, low-fat, low fiber and to monitor his stools.  If recurrence of loose stool or diarrhea he will increase fluids such as Gatorade for electrolyte replacement.  Discussed with infectious prevention regarding newly diagnosed C. difficile and no isolation but to practice good hand hygiene, clean close, complete vancomycin p.o.  Follow-up as below and in AVS.       Ischemic cardiomyopathy with chronic HFrEF  Coronary artery disease with prior NSTEMI and THEODORE to OM 22  Hemopericardium s/p evacuation and pericardial window  Paroxysmal atrial fibrillation  Dyslipidemia  *Complicated recent history of above with prolonged hospitalization in [place].  *Most recent echocardiogram 2022 with EF 35 to 40%, no pericardial effusion  - Continue prior to admission amiodarone, clopidogrel, atorvastatin  -Restart apixaban tomorrow morning.     Hx of acute kidney injury with acute tubular necrosis requiring HD  Creatinine 1.23 on admission which is stable from recent outpatient values. Remains off HD and making urine.   - Monitor BMP     Spinal stenosis with neurogenic claudication  History multiple back surgeries  - Continue prior to admission gabapentin, tramadol     GERD  - Continue prior to admission PPI     Code Status   Full Code       Primary Care Physician   Physician No Ref-Primary    Physical Exam   Temp: 98  F (36.7  C) Temp src: Oral BP: 97/67 Pulse: 88   Resp: 18 SpO2: 96 % O2 Device: None  (Room air)    Vitals:    10/04/22 0917   Weight: 60.8 kg (134 lb)     Vital Signs with Ranges  Temp:  [97.4  F (36.3  C)-98.2  F (36.8  C)] 98  F (36.7  C)  Pulse:  [77-88] 88  Resp:  [18] 18  BP: (95-99)/(61-69) 97/67  SpO2:  [95 %-97 %] 96 %  I/O last 3 completed shifts:  In: 480 [P.O.:480]  Out: 1600 [Urine:1400; Drains:200]    Constitutional:  NAD, alert, calm, cooperative seen after IR tube removal    Eyes: PERRL, EOMI  HENT: Oropharynx clear, MMM  Respiratory:  Respirations nonlabored room air  Cardiovascular:  Regular, no murmur no peripheral edema.   GI:  Soft, nontender.  No rebound, guarding or other peritoneal signs.  Drain out, dressed.    Neuro.  Gross motor tested, nonfocal, sensory intact  Psych oriented, affect calm     Discharge Disposition   Discharged to home  Condition at discharge: Fair    Consultations This Hospital Stay   SURGERY GENERAL IP CONSULT  INTERVENTIONAL RADIOLOGY ADULT/PEDS IP CONSULT    Time Spent on this Encounter   IJuan Francisco MD, personally saw the patient today and spent greater than 30 minutes discharging this patient discussing discharge plans with Patient, Wife, Nursing, coordinating with Specialties, General Surgery and IR regarding discharge recommendations and follow-up; completing discharge orders, medications and follow-up.    Discharge Orders      Reason for your hospital stay    Presented with Right Upper Abdominal Pain     Activity    Your activity upon discharge: activity as tolerated    Regarding diagnosis of C Difficile; practice good hand hygiene and clean clothes     Follow-up and recommended labs and tests     Follow up with primary care provider on your return to [facility] within 7 days for hospital follow- up.  The following labs/tests are recommended: BMP, LFT    Follow-up with Surgeon on return to [facility] regarding cholecystomy tube   .    Follow up with Vascular and Cardiology in [facility] as scheduled.     Diet    Follow this diet upon  discharge:       Regular Diet Adult; low fat diet; lower fiber ie limit raw vegetable     Discharge Medications   Current Discharge Medication List      START taking these medications    Details   vancomycin (VANCOCIN) 125 MG capsule Take 1 capsule (125 mg) by mouth 4 times daily START this evening; finish bottle; discuss with Primary Provider if ongoing need.  Qty: 53 capsule, Refills: 0    Associated Diagnoses: C. difficile diarrhea         CONTINUE these medications which have NOT CHANGED    Details   acetaminophen (TYLENOL) 500 MG tablet Take 500 mg by mouth 2 times daily as needed for mild pain      amiodarone (PACERONE) 100 MG TABS tablet Take 100 mg by mouth daily      apixaban ANTICOAGULANT (ELIQUIS) 5 MG tablet Take 5 mg by mouth 2 times daily: Restart tomorrow AM, 10/6/22      atorvastatin (LIPITOR) 40 MG tablet Take 40 mg by mouth daily      clopidogrel (PLAVIX) 75 MG tablet Take 75 mg by mouth daily      gabapentin (NEURONTIN) 100 MG capsule Take 100 mg by mouth 2 times daily      pantoprazole (PROTONIX) 40 MG EC tablet Take 40 mg by mouth 2 times daily      polyethylene glycol (MIRALAX) 17 g packet Take 1 packet by mouth daily as needed for constipation      senna-docusate (SENOKOT-S/PERICOLACE) 8.6-50 MG tablet Take 1 tablet by mouth daily as needed for constipation      traMADol (ULTRAM) 50 MG tablet Take 50 mg by mouth every 6 hours as needed for severe pain           Allergies   No Known Allergies  Data   Most Recent 3 CBC's:Recent Labs   Lab Test 10/05/22  0704 10/04/22  0953   WBC 8.7 13.7*   HGB 10.3* 11.4*   MCV 94 95    310      Most Recent 3 BMP's:  Recent Labs   Lab Test 10/05/22  0704 10/04/22  0953    137   POTASSIUM 4.5 4.7   CHLORIDE 109 105   CO2 27 23   BUN 15 17   CR 1.18 1.23   ANIONGAP 2* 9   KAMILA 8.9 9.9   GLC 91 94     Most Recent 2 LFT's:  Recent Labs   Lab Test 10/05/22  0704 10/04/22  0953   AST 15 16   ALT 21 30   ALKPHOS 104 118   BILITOTAL 0.4 0.9

## 2022-10-05 NOTE — PROGRESS NOTES
"Hospitalist main messaged with this message\"Pt is wondering if he is staying or leaving? drain sutured and clamped. please advise \". Pending call back. Will continue to monitor patient. Johnnie Martinez RN on 10/5/2022 at 11:39 AM    1148 Hospitalist main messaged with this message\" pt is also wondering about the diet. thank you. johnnie rn\". Pending call back. Will continue to monitor patient. Johnnie Martinez RN on 10/5/2022 at 11:48 AM      1440 Patient tolerated food ok, IR is sending drain bag. Nurse called IR twice for surgery note. Johnnie Martinez RN on 10/5/2022 at 2:43 PM    1510- Hospitalist vocera messaged with this message\" surgery recommendations are in discharge AVS, pt is ready for discharge. Thank you \". Pending call back. Will continue to monitor patient. Johnnie Martinez RN on 10/5/2022 at 3:11 PM        "

## 2022-10-05 NOTE — IR NOTE
Interventional Radiology Intra-procedural Nursing Note    Patient Name: Stanford Moy  Medical Record Number: [id number]  Today's Date: October 5, 2022    Start Time: 1043  End of procedure time: 1055  Procedure: Gallbladder drain check and possible exchange with possible IV moderate sedation   Report given to: Short Stay RN  Time pt departs:  1100    Other Notes: Pt into IR suite 1 via cart. Pt awake and alert. To table in supine position. Monitoring equipment applied. VSS. Tele SR. Dr. Rodriguez in room. Time out and procedure started. Pt tolerated procedure well. Debrief with Dr. Rodriguez. Suture placed and pressure held until hemostasis achieved. Drain Capped. Dressing CDI. No complications. Pt transferred back to Short Stay.    Medications:    Lidocaine 1% 1 ml    Mian Velasquez RN

## 2022-10-05 NOTE — PLAN OF CARE
Goal Outcome Evaluation:  Observation goals  PRIOR TO DISCHARGE        Comments:   -diagnostic tests and consults completed and resulted: not met     -vital signs normal or at patient baseline: not met, soft BP      -safe disposition plan has been identified: not met      Nurse to notify provider when observation goals have been met and patient is ready for discharge.

## 2022-10-05 NOTE — PLAN OF CARE
RECEIVING UNIT ED HANDOFF REVIEW    ED Nurse Handoff Report was reviewed by: Jayleen Jaime RN on October 5, 2022 at 12:39 AM

## 2022-10-05 NOTE — PROGRESS NOTES
Reviewed discharge instructions with patient and his wife today after speaking with IR Dr Rodriguez which follows:     -Keep the drain capped unless Hilario feels increased, persistent RUQ pressure, pain, jaundice, nausea.  -Attach the leg drainage bag directly to the gallbladder tube and allow to drain.   -The leg bag which was tubed up from IR was brought to the patient's room and placed on the bedside stand.   -Number to call for questions are in the AVS.   -Further decisions on removing the gallbladder drain or the gallbladder per the patient's surgeon in [place].     Thanks, [facility] Interventional Radiology CNP ([phone]) (phone [phone])

## 2022-10-05 NOTE — CONSULTS
[facility] General Surgery Consultation    Stanford Moy MRN# [id number]   YOB: 1955 Age: 67 year old      Date of Admission:  10/4/2022  Date of Consult: 10/5/2022         Assessment and Plan:   Patient is a 67 year old male with complex pmhx including recent NSTEMI with stent placement on 7/22/2022, ischemic cardiomyopathy with HFrEF, recent PEA arrest with hemopericardium s/p median sternotomy and evacuation, paroxysmal atrial fibrillation, and acalculous cholecystitis s/p cholecystostomy tube placement now presenting with right upper abdominal pain, nausea, diarrhea, and early satiety. Cholecystotomy tube clinically appears to be functioning appropriately as it continues to drain bilious output. Additionally, patient recently diagnosed with c.dificille, likely from previous admission and antibiotic treatment, which could partially explain some of his symptoms.    PLAN:  - Agree with plan for cholecystotomy tube interrogation with IR  - Continue antibiotics per primary  - No plans or indication for emergent or urgent surgical intervention, patient remains a poor surgical candidate. Would continue cholecystotomy tube as appropriate if functioning appropriately         Requesting Physician:      Toño Almeida MD        Chief Complaint:     Chief Complaint   Patient presents with     Abdominal Pain     Nausea, Vomiting, & Diarrhea          History of Present Illness:   Stanford Moy is a 67 year old male who was seen in consultation at the request of Dr. Toño Almeida with concerns for right upper abdominal pain, nausea, diarrhea, and early satiety with current cholecystotomy tube in place. Stanford presented to the ED last night with the aforementioned symptoms. He has a complex medical history including recent NSTEMI with stent placement on 7/22/2022, ischemic cardiomyopathy with HFrEF, recent PEA arrest with hemopericardium s/p median sternotomy and evacuation, paroxysmal atrial  "fibrillation, and acalculous cholecystitis s/p cholecystostomy tube placement in early September. He indicates that he developed right upper abdominal discomfort a few days ago and since this time has had nausea, decreased appetite, and early satiety. Additionally, he developed a single episode of diarrhea yesterday. He has not had fevers, chills, hematochezia or melenic stools, or changes in urination. His cholecystotomy tube continues to function and drain appropriately, he has not noticed decreased drainage. Of note, on admission, he was tested and found to be positive for c. difficile, now receiving treatment with oral vancomycin.           Physical Exam:   Blood pressure 99/69, pulse 88, temperature 98.2  F (36.8  C), temperature source Oral, resp. rate 18, height 1.702 m (5' 7\"), weight 60.8 kg (134 lb), SpO2 97 %.  134 lbs 0 oz  General: Pleasant, lying in bed in NAD  Psych: Alert and Oriented.  Normal affect  Neurological: grossly intact  Eyes: Sclera clear  Respiratory:  nonlabored breathing on room air  Cardiovascular/Chest: Regular rate. Well-healed median sternotomy and inferior chest tube scars  GI: RUQ cholecystotomy tube in place draining bilious appearing output in bag. Mildly tender to palpation in the RUQ under costal margin, minimally tender to palpation over tube insertion site. Well-healed left-sided abdominal scar  Integumentary:  No rashes         Past Medical History:     Past Medical History:   Diagnosis Date     Acalculous cholecystitis     s/p cholecystostomy tube     Acute kidney injury with acute tubular necrosis (H)     Required temporary HD, now off     Cardiac arrest (H)     possibly from hemopericardium     Chronic systolic congestive heart failure (H)      Coronary artery disease involving native coronary artery of native heart     s/p THEODORE to OM 7/22/22     Hemopericardium     with tamponade s/p evacuation     Ischemic cardiomyopathy      NSTEMI (non-ST elevated myocardial " infarction) (H)      Paroxysmal atrial fibrillation (H)      Spinal stenosis of lumbar region with neurogenic claudication             Past Surgical History:     Past Surgical History:   Procedure Laterality Date     BACK SURGERY      L5-S1 4/1984, L4-L5 2/1997, Fusion 4/2011            Current Medications:           amiodarone  100 mg Oral Daily     [Held by provider] apixaban ANTICOAGULANT  5 mg Oral BID     clopidogrel  75 mg Oral Daily     gabapentin  100 mg Oral BID     pantoprazole  40 mg Oral BID     vancomycin  125 mg Oral 4x Daily       bisacodyl, melatonin, ondansetron **OR** ondansetron, polyethylene glycol, prochlorperazine **OR** prochlorperazine **OR** prochlorperazine, traMADol         Home Medications:     Prior to Admission medications    Medication Sig Last Dose Taking? Auth Provider Long Term End Date   acetaminophen (TYLENOL) 500 MG tablet Take 500 mg by mouth 2 times daily as needed for mild pain  Yes Unknown, Entered By History     amiodarone (PACERONE) 100 MG TABS tablet Take 100 mg by mouth daily 10/4/2022 at noon Yes Unknown, Entered By History Yes    apixaban ANTICOAGULANT (ELIQUIS) 5 MG tablet Take 5 mg by mouth 2 times daily 10/3/2022 at pm Yes Unknown, Entered By History     atorvastatin (LIPITOR) 40 MG tablet Take 40 mg by mouth daily Past Week at Unknown time Yes Unknown, Entered By History Yes    clopidogrel (PLAVIX) 75 MG tablet Take 75 mg by mouth daily 10/3/2022 Yes Unknown, Entered By History Yes    gabapentin (NEURONTIN) 100 MG capsule Take 100 mg by mouth 2 times daily 10/3/2022 at pm Yes Unknown, Entered By History Yes    pantoprazole (PROTONIX) 40 MG EC tablet Take 40 mg by mouth 2 times daily 10/4/2022 at noon Yes Unknown, Entered By History     polyethylene glycol (MIRALAX) 17 g packet Take 1 packet by mouth daily as needed for constipation  Yes Unknown, Entered By History     senna-docusate (SENOKOT-S/PERICOLACE) 8.6-50 MG tablet Take 1 tablet by mouth daily as needed for  constipation  Yes Unknown, Entered By History     traMADol (ULTRAM) 50 MG tablet Take 50 mg by mouth every 6 hours as needed for severe pain Past Month at Unknown time Yes Unknown, Entered By History              Allergies:   No Known Allergies         Family History:     Family History   Problem Relation Age of Onset     Leukemia Mother      Myocardial Infarction Father      Myocardial Infarction Sister      Myocardial Infarction Brother      Denies family history of adverse reaction to anesthesia or known coagulopathy        Social History:   Stanford Moy  reports that he has never smoked. He does not have any smokeless tobacco history on file.          Review of Systems:   The 10 point Review of Systems is negative other than noted in the HPI.         Labs/Imaging   All new lab and imaging data was reviewed.   I have personally reviewed the imaging studies        Henry Love MD   [facility]  p:[phone]

## 2022-10-05 NOTE — PLAN OF CARE
Orientation/Cognitive: A/O x4   Observation Goals (Met/ Not Met): not met   Mobility Level/Assist Equipment: A/O x2/GB/W pivot for transfers vs Ax2 w/ lift     Fall Risk (Y/N): Y   Behavior Concerns: Green   Pain Management: denies pain   Tele/VS/O2: VSS on RA, ex soft BP. Tele: SR/ ST     ABNL Lab/BG: WBC 13.7, Hgb 11.4, C. Diff +ve   Diet: NPO   Bowel/Bladder: Continent   Skin Concerns: Gangrene BLE, cholecystostomy tube RUQ  Drains/Devices: IVF @100/hr, cholecystostomy tube   Tests/Procedures for next shift: IR, Gen surg consults   Anticipated DC date & active delays: TBD   Patient Stated Goal for Today: Rest  Other: Ok for spouse to stay overnight per charge RN and ANS         Goal Outcome Evaluation:  Observation goals  PRIOR TO DISCHARGE        Comments:   -diagnostic tests and consults completed and resulted: not met      -vital signs normal or at patient baseline: not met, soft BP       -safe disposition plan has been identified: not met       Nurse to notify provider when observation goals have been met and patient is ready for discharge.

## 2022-10-07 ENCOUNTER — PATIENT OUTREACH (OUTPATIENT)
Dept: CARE COORDINATION | Facility: CLINIC | Age: 67
End: 2022-10-07

## 2022-10-07 NOTE — PROGRESS NOTES
[facility] Contact  [facility]/Voicemail     Clinical Data: Transitional Care Management Outreach     Outreach attempted x 2.  Left message on patient's voicemail, providing [facility]'s 24/7 scheduling and nurse triage phone number [phone] ([phone]) for questions/concerns and/or to schedule an appt with an [facility] provider, if they do not have a PCP.      Plan:  [facility] will do no further outreaches at this time.       Batool Crowell  Community Health Worker  [facility], [facility]  [phone]      *Connected Care Resource Team does NOT follow patient ongoing. Referrals are identified based on internal discharge reports and the outreach is to ensure patient has an understanding of their discharge instructions.

## 2022-10-22 ENCOUNTER — HEALTH MAINTENANCE LETTER (OUTPATIENT)
Age: 67
End: 2022-10-22

## 2023-08-27 ENCOUNTER — HEALTH MAINTENANCE LETTER (OUTPATIENT)
Age: 68
End: 2023-08-27

## 2024-10-20 ENCOUNTER — HEALTH MAINTENANCE LETTER (OUTPATIENT)
Age: 69
End: 2024-10-20